# Patient Record
Sex: FEMALE | Race: WHITE | NOT HISPANIC OR LATINO | Employment: OTHER | URBAN - METROPOLITAN AREA
[De-identification: names, ages, dates, MRNs, and addresses within clinical notes are randomized per-mention and may not be internally consistent; named-entity substitution may affect disease eponyms.]

---

## 2017-09-08 ENCOUNTER — OFFICE VISIT (OUTPATIENT)
Dept: ORTHOPEDIC SURGERY | Facility: CLINIC | Age: 67
End: 2017-09-08

## 2017-09-08 VITALS — HEIGHT: 68 IN | TEMPERATURE: 98.1 F | BODY MASS INDEX: 41.98 KG/M2 | WEIGHT: 277 LBS

## 2017-09-08 DIAGNOSIS — Z98.1 HISTORY OF LUMBAR FUSION: Primary | ICD-10-CM

## 2017-09-08 DIAGNOSIS — M25.519 NECK AND SHOULDER PAIN: ICD-10-CM

## 2017-09-08 DIAGNOSIS — Z98.1 S/P CERVICAL SPINAL FUSION: ICD-10-CM

## 2017-09-08 DIAGNOSIS — M54.2 NECK AND SHOULDER PAIN: ICD-10-CM

## 2017-09-08 PROCEDURE — 72040 X-RAY EXAM NECK SPINE 2-3 VW: CPT | Performed by: ORTHOPAEDIC SURGERY

## 2017-09-08 PROCEDURE — 99213 OFFICE O/P EST LOW 20 MIN: CPT | Performed by: ORTHOPAEDIC SURGERY

## 2017-09-08 PROCEDURE — 73030 X-RAY EXAM OF SHOULDER: CPT | Performed by: ORTHOPAEDIC SURGERY

## 2017-09-08 NOTE — PROGRESS NOTES
She is complaining of neck and back pain which is chronic.  I've done the anterior cervical disc infusion couple years ago and in the low back laminectomy and fusion longer ago.  She also has some left shoulder pain which is chronic.  She seen Dr. Tyson for this years ago.  On exam intact neurologic function the upper and lower extremities.  She does have limited external rotation of the left shoulder and slight weakness in abduction.  Almost feels like a frozen shoulder.  Lumbar x-rays are obscured by artifact and I did not charge her for these.  Nothing grossly changed compared to prior films.  Cervical films show solid fusion with minimal change above which is the similar to last x-ray.  Left shoulder films obtained today show some inferior glenohumeral spurring which was not present in 2010 I'm going to recommend traction for the neck bad like Dr. Tyson to look at her shoulder it's possibility of frozen shoulders from the neck but I think she is also developed the degenerative arthritis over the years

## 2017-10-09 ENCOUNTER — CONSULT (OUTPATIENT)
Dept: ORTHOPEDIC SURGERY | Facility: CLINIC | Age: 67
End: 2017-10-09

## 2017-10-09 VITALS — TEMPERATURE: 98.2 F | WEIGHT: 283.6 LBS | HEIGHT: 67 IN | BODY MASS INDEX: 44.51 KG/M2

## 2017-10-09 DIAGNOSIS — R52 PAIN: Primary | ICD-10-CM

## 2017-10-09 DIAGNOSIS — M19.012 PRIMARY LOCALIZED OSTEOARTHROSIS OF LEFT SHOULDER REGION: ICD-10-CM

## 2017-10-09 DIAGNOSIS — IMO0002 BURSITIS/TENDONITIS, SHOULDER: ICD-10-CM

## 2017-10-09 PROCEDURE — 20610 DRAIN/INJ JOINT/BURSA W/O US: CPT | Performed by: ORTHOPAEDIC SURGERY

## 2017-10-09 PROCEDURE — 99214 OFFICE O/P EST MOD 30 MIN: CPT | Performed by: ORTHOPAEDIC SURGERY

## 2017-10-09 RX ADMIN — BUPIVACAINE HYDROCHLORIDE 4 ML: 5 INJECTION, SOLUTION EPIDURAL; INTRACAUDAL at 14:58

## 2017-10-09 RX ADMIN — METHYLPREDNISOLONE ACETATE 80 MG: 80 INJECTION, SUSPENSION INTRA-ARTICULAR; INTRALESIONAL; INTRAMUSCULAR; SOFT TISSUE at 14:58

## 2017-10-09 NOTE — PROGRESS NOTES
"   New Left Shoulder      Patient: Nancy Sawant        YOB: 1950    Medical Record Number: [id number]        Chief Complaints: Left shoulder pain  Chief Complaint   Patient presents with   • Left Shoulder - Establish Care, Pain           History of Present Illness: This is a  66 y.o. female who presents with ( With complaints of left shoulder pain she is right-hand-dominant is been ongoing \"a long time.\"  Worse the last couple years much worse the last couple months.  Initially was intermittent now is constant she has no night pain she can get her bra on but it does hurt.  She does have some limitation of her motion her symptoms are severe constant stabbing aching burning clicking popping snapping.  She is retired her past medical history is mild for diabetes anemia asthma depression anxiety sleep apnea and obesity          Allergies:   Allergies   Allergen Reactions   • Ace Inhibitors Other (See Comments)     COUGH       Medications:   Home Medications:  Current Outpatient Prescriptions on File Prior to Visit   Medication Sig   • ACCU-CHEK CLOVIS PLUS test strip    • aspirin 81 MG EC tablet Take 81 mg by mouth daily.   • atenolol (TENORMIN) 25 MG tablet TAKE ONE TABLET BY MOUTH ONE TIME DAILY   • baclofen (LIORESAL) 10 MG tablet TAKE ONE TABLET BY MOUTH NIGHTLY AT BEDTIME   • BD PEN NEEDLE BRIELLE U/F 32G X 4 MM misc    • diclofenac (VOLTAREN) 50 MG EC tablet TAKE 1 TABLET BY MOUTH 2 (TWO) TIMES DAILY FOR 30 DAYS   • DULoxetine (CYMBALTA) 60 MG capsule TAKE ONE CAPSULE BY MOUTH ONE TIME DAILY   • esomeprazole (NexIUM) 40 MG capsule Take 40 mg by mouth every morning before breakfast.   • gabapentin (NEURONTIN) 100 MG capsule    • glipiZIDE (GLUCOTROL) 10 MG tablet    • glipiZIDE (GLUCOTROL) 10 MG tablet TAKE ONE TABLET BY MOUTH TWICE DAILY BEFORE MEALS   • hydrochlorothiazide (HYDRODIURIL) 25 MG tablet TAKE ONE TABLET BY MOUTH ONE TIME DAILY   • JANUVIA 100 MG tablet TAKE ONE TABLET BY MOUTH ONE TIME " DAILY   • lidocaine (LIDODERM) 5 %    • losartan (COZAAR) 100 MG tablet TAKE ONE TABLET BY MOUTH ONE TIME DAILY   • meloxicam (MOBIC) 7.5 MG tablet Take 7.5 mg by mouth daily.   • metFORMIN (GLUCOPHAGE) 1000 MG tablet TAKE ONE TABLET BY MOUTH TWICE A DAY WITH MEALS   • montelukast (SINGULAIR) 10 MG tablet    • Multiple Vitamin (MULTI VITAMIN DAILY PO) Take  by mouth.   • Omega-3 Fatty Acids (FISH OIL PO) Take  by mouth.   • sulfamethoxazole-trimethoprim (BACTRIM DS,SEPTRA DS) 800-160 MG per tablet TAKE 1 TABLET BY MOUTH TWICE A DAY FOR 10 DAYS   • VITAMIN D, CHOLECALCIFEROL, PO Take  by mouth.   • XOPENEX HFA 45 MCG/ACT inhaler      No current facility-administered medications on file prior to visit.      Current Medications:  Scheduled Meds:  Continuous Infusions:  No current facility-administered medications for this visit.   PRN Meds:.    Past Medical History:   Diagnosis Date   • Anemia    • Arthritis    • Asthma    • Diabetes    • High blood pressure    • Sleep apnea         Past Surgical History:   Procedure Laterality Date   • BACK SURGERY     • BRAIN SURGERY     • BREAST BIOPSY     • CHOLECYSTECTOMY     • ELBOW PROCEDURE     • FOOT SURGERY     • HAND SURGERY     • NECK SURGERY     • WRIST SURGERY          Social History     Occupational History   • Not on file.     Social History Main Topics   • Smoking status: Former Smoker     Quit date: 1992   • Smokeless tobacco: Not on file   • Alcohol use Defer   • Drug use: Defer   • Sexual activity: Defer    Social History     Social History Narrative        Family History   Problem Relation Age of Onset   • No Known Problems Mother    • No Known Problems Father    • No Known Problems Sister    • No Known Problems Brother    • No Known Problems Maternal Aunt    • No Known Problems Maternal Uncle    • No Known Problems Paternal Aunt    • No Known Problems Paternal Uncle    • No Known Problems Maternal Grandmother    • No Known Problems Maternal Grandfather    • No  "Known Problems Paternal Grandmother    • No Known Problems Paternal Grandfather    • Cancer Other    • Clotting disorder Other    • Hypertension Other    • Anesthesia problems Neg Hx    • Broken bones Neg Hx    • Collagen disease Neg Hx    • Diabetes Neg Hx    • Dislocations Neg Hx    • Osteoporosis Neg Hx    • Rheumatologic disease Neg Hx    • Scoliosis Neg Hx    • Severe sprains Neg Hx              Review of Systems: 14 point review of systems are remarkable for the pertinent positives listed in the chart by the patient the remainder are negative    Review of Systems      Physical Exam: 66 y.o. female  General Appearance:    Alert, cooperative, in no acute distress                 Vitals:    10/09/17 1407   Temp: 98.2 °F (36.8 °C)   TempSrc: Temporal Artery    Weight: 283 lb 9.6 oz (129 kg)   Height: 67\" (170.2 cm)      Patient is alert and read ×3 no acute distress appears her above-listed at height weight and age.  Affect is normal respiratory rate is normal unlabored. Heart rate regular rate rhythm, sclera, dentition and hearing are normal for the purpose of this exam.    Ortho Exam Physical exam of the left shoulder reveals no overlying skin changes no lymphedema no lymphadenopathy.  Patient has active flexion 175 with mild symptoms abduction is similar external rotation is to 40 and internal rotation to the upper lumbar spine with mild symptoms.  Patient has good rotator cuff strength 4+ over 5 with isometric strength testing with pain.  Patient has a positive impingement and a positive Bell sign.  Patient has good cervical range of motion which is full and asymptomatic no radicular symptoms.  Patient has a normal elbow exam.  Good distal pulses are presentPatient has pain with overhead activity and a positive Neer sign and a positive empty can sign        Large Joint Arthrocentesis  Date/Time: 10/9/2017 2:58 PM  Consent given by: patient  Site marked: site marked  Timeout: Immediately prior to procedure a " time out was called to verify the correct patient, procedure, equipment, support staff and site/side marked as required   Supporting Documentation  Indications: pain   Procedure Details  Location: shoulder - L subacromial bursa  Preparation: Patient was prepped and draped in the usual sterile fashion  Needle size: 25 G  Approach: posterior  Medications administered: 4 mL bupivacaine (PF) 0.5 %; 80 mg methylPREDNISolone acetate 80 MG/ML  Patient tolerance: patient tolerated the procedure well with no immediate complications                Radiology:   AP, Scapular Y and Axillary Lateral of the left shoulder were ordered/reviewed to evauate shoulder pain.  These were taken in the office previously by another provider I did review these today she does have some very mild degenerative changes as best seen on the axillary lateral she has narrowing of the joint space posteriorly some mild acromioclavicular arthritis no acute bony pathology    Assessment/Plan:    Left shoulder pain I do think she has some mild arthritis of think she has some limitation of her motion could be he's a capsulitis most likely it is arthritis.  I do think there are some rotator cuff involvement.  Plan is proceed with an injection I would do half glenohumeral have subacromial have her see physical therapy if she fails to improve we will pursue other means of testing

## 2017-10-10 RX ORDER — METHYLPREDNISOLONE ACETATE 80 MG/ML
80 INJECTION, SUSPENSION INTRA-ARTICULAR; INTRALESIONAL; INTRAMUSCULAR; SOFT TISSUE
Status: COMPLETED | OUTPATIENT
Start: 2017-10-09 | End: 2017-10-09

## 2017-10-10 RX ORDER — BUPIVACAINE HYDROCHLORIDE 5 MG/ML
4 INJECTION, SOLUTION EPIDURAL; INTRACAUDAL
Status: COMPLETED | OUTPATIENT
Start: 2017-10-09 | End: 2017-10-09

## 2017-11-20 ENCOUNTER — APPOINTMENT (OUTPATIENT)
Dept: WOMENS IMAGING | Facility: HOSPITAL | Age: 67
End: 2017-11-20

## 2017-11-20 PROCEDURE — G0202 SCR MAMMO BI INCL CAD: HCPCS | Performed by: RADIOLOGY

## 2017-11-20 PROCEDURE — 77063 BREAST TOMOSYNTHESIS BI: CPT | Performed by: RADIOLOGY

## 2017-12-13 ENCOUNTER — OFFICE (OUTPATIENT)
Dept: URBAN - METROPOLITAN AREA CLINIC 64 | Facility: CLINIC | Age: 67
End: 2017-12-13
Payer: COMMERCIAL

## 2017-12-13 ENCOUNTER — AMBULATORY SURGICAL CENTER (OUTPATIENT)
Dept: URBAN - METROPOLITAN AREA SURGERY 20 | Facility: SURGERY | Age: 67
End: 2017-12-13
Payer: COMMERCIAL

## 2017-12-13 DIAGNOSIS — K21.0 GASTRO-ESOPHAGEAL REFLUX DISEASE WITH ESOPHAGITIS: ICD-10-CM

## 2017-12-13 PROBLEM — K21.9 GASTROESOPHAGEAL REFLUX DISEASE: Status: ACTIVE | Noted: 2017-12-13

## 2017-12-13 PROBLEM — K31.7 POLYP OF STOMACH AND DUODENUM: Status: ACTIVE | Noted: 2017-12-13

## 2017-12-13 PROCEDURE — 43239 EGD BIOPSY SINGLE/MULTIPLE: CPT

## 2017-12-13 PROCEDURE — 88305 TISSUE EXAM BY PATHOLOGIST: CPT

## 2018-08-27 ENCOUNTER — OFFICE (OUTPATIENT)
Dept: URBAN - METROPOLITAN AREA CLINIC 66 | Facility: CLINIC | Age: 68
End: 2018-08-27
Payer: COMMERCIAL

## 2018-08-27 VITALS
HEART RATE: 97 BPM | SYSTOLIC BLOOD PRESSURE: 131 MMHG | HEIGHT: 67 IN | WEIGHT: 273 LBS | DIASTOLIC BLOOD PRESSURE: 71 MMHG

## 2018-08-27 DIAGNOSIS — R94.5 ABNORMAL RESULTS OF LIVER FUNCTION STUDIES: ICD-10-CM

## 2018-08-27 DIAGNOSIS — K52.9 NONINFECTIVE GASTROENTERITIS AND COLITIS, UNSPECIFIED: ICD-10-CM

## 2018-08-27 DIAGNOSIS — R10.13 EPIGASTRIC PAIN: ICD-10-CM

## 2018-08-27 DIAGNOSIS — R14.0 ABDOMINAL DISTENSION (GASEOUS): ICD-10-CM

## 2018-08-27 PROCEDURE — 99214 OFFICE O/P EST MOD 30 MIN: CPT

## 2018-11-19 ENCOUNTER — APPOINTMENT (OUTPATIENT)
Dept: WOMENS IMAGING | Facility: HOSPITAL | Age: 68
End: 2018-11-19

## 2018-11-19 PROCEDURE — 77063 BREAST TOMOSYNTHESIS BI: CPT | Performed by: RADIOLOGY

## 2018-11-19 PROCEDURE — 77067 SCR MAMMO BI INCL CAD: CPT | Performed by: RADIOLOGY

## 2018-12-03 ENCOUNTER — APPOINTMENT (OUTPATIENT)
Dept: WOMENS IMAGING | Facility: HOSPITAL | Age: 68
End: 2018-12-03

## 2018-12-03 PROCEDURE — 77065 DX MAMMO INCL CAD UNI: CPT | Performed by: RADIOLOGY

## 2019-03-08 ENCOUNTER — OFFICE (OUTPATIENT)
Dept: URBAN - METROPOLITAN AREA PATHOLOGY 4 | Facility: PATHOLOGY | Age: 69
End: 2019-03-08
Payer: COMMERCIAL

## 2019-03-08 ENCOUNTER — AMBULATORY SURGICAL CENTER (OUTPATIENT)
Dept: URBAN - METROPOLITAN AREA SURGERY 20 | Facility: SURGERY | Age: 69
End: 2019-03-08
Payer: COMMERCIAL

## 2019-03-08 VITALS — HEIGHT: 67 IN

## 2019-03-08 DIAGNOSIS — K31.89 OTHER DISEASES OF STOMACH AND DUODENUM: ICD-10-CM

## 2019-03-08 DIAGNOSIS — K31.7 POLYP OF STOMACH AND DUODENUM: ICD-10-CM

## 2019-03-08 DIAGNOSIS — R13.10 DYSPHAGIA, UNSPECIFIED: ICD-10-CM

## 2019-03-08 DIAGNOSIS — K21.9 GASTRO-ESOPHAGEAL REFLUX DISEASE WITHOUT ESOPHAGITIS: ICD-10-CM

## 2019-03-08 DIAGNOSIS — K29.60 OTHER GASTRITIS WITHOUT BLEEDING: ICD-10-CM

## 2019-03-08 DIAGNOSIS — K52.9 NONINFECTIVE GASTROENTERITIS AND COLITIS, UNSPECIFIED: ICD-10-CM

## 2019-03-08 DIAGNOSIS — D64.9 ANEMIA, UNSPECIFIED: ICD-10-CM

## 2019-03-08 DIAGNOSIS — K57.30 DIVERTICULOSIS OF LARGE INTESTINE WITHOUT PERFORATION OR ABS: ICD-10-CM

## 2019-03-08 PROBLEM — K29.70 GASTRITIS, UNSPECIFIED, WITHOUT BLEEDING: Status: ACTIVE | Noted: 2019-03-08

## 2019-03-08 LAB
GI HISTOLOGY: A. SELECT: (no result)
GI HISTOLOGY: B. SELECT: (no result)
GI HISTOLOGY: C. SELECT: (no result)
GI HISTOLOGY: E. SELECT: (no result)
GI HISTOLOGY: F. UNSPECIFIED: (no result)
GI HISTOLOGY: PDF REPORT: (no result)
Lab: (no result)

## 2019-03-08 PROCEDURE — 45380 COLONOSCOPY AND BIOPSY: CPT

## 2019-03-08 PROCEDURE — 88305 TISSUE EXAM BY PATHOLOGIST: CPT

## 2019-03-08 PROCEDURE — 43239 EGD BIOPSY SINGLE/MULTIPLE: CPT

## 2019-07-22 ENCOUNTER — APPOINTMENT (OUTPATIENT)
Dept: WOMENS IMAGING | Facility: HOSPITAL | Age: 69
End: 2019-07-22

## 2019-07-22 PROCEDURE — G0279 TOMOSYNTHESIS, MAMMO: HCPCS | Performed by: RADIOLOGY

## 2019-07-22 PROCEDURE — 77065 DX MAMMO INCL CAD UNI: CPT | Performed by: RADIOLOGY

## 2020-02-13 ENCOUNTER — OFFICE VISIT (OUTPATIENT)
Dept: ORTHOPEDIC SURGERY | Facility: CLINIC | Age: 70
End: 2020-02-13

## 2020-02-13 VITALS — HEIGHT: 67 IN | TEMPERATURE: 98 F | WEIGHT: 268.3 LBS | BODY MASS INDEX: 42.11 KG/M2

## 2020-02-13 DIAGNOSIS — M25.511 BILATERAL SHOULDER PAIN, UNSPECIFIED CHRONICITY: Primary | ICD-10-CM

## 2020-02-13 DIAGNOSIS — M25.512 BILATERAL SHOULDER PAIN, UNSPECIFIED CHRONICITY: Primary | ICD-10-CM

## 2020-02-13 DIAGNOSIS — IMO0002 BURSITIS/TENDONITIS, SHOULDER: ICD-10-CM

## 2020-02-13 DIAGNOSIS — M19.012 PRIMARY LOCALIZED OSTEOARTHROSIS OF LEFT SHOULDER REGION: ICD-10-CM

## 2020-02-13 PROCEDURE — 73030 X-RAY EXAM OF SHOULDER: CPT | Performed by: ORTHOPAEDIC SURGERY

## 2020-02-13 PROCEDURE — 20610 DRAIN/INJ JOINT/BURSA W/O US: CPT | Performed by: ORTHOPAEDIC SURGERY

## 2020-02-13 PROCEDURE — 99214 OFFICE O/P EST MOD 30 MIN: CPT | Performed by: ORTHOPAEDIC SURGERY

## 2020-02-13 RX ORDER — CETIRIZINE HYDROCHLORIDE 10 MG/1
10 TABLET ORAL DAILY
COMMUNITY

## 2020-02-13 RX ORDER — RIBOFLAVIN (VITAMIN B2) 100 MG
TABLET ORAL
COMMUNITY

## 2020-02-13 RX ORDER — CARVEDILOL 12.5 MG/1
12.5 TABLET ORAL 2 TIMES DAILY WITH MEALS
COMMUNITY

## 2020-02-13 RX ORDER — AMLODIPINE BESYLATE 5 MG/1
5 TABLET ORAL DAILY
COMMUNITY

## 2020-02-13 RX ADMIN — METHYLPREDNISOLONE ACETATE 80 MG: 80 INJECTION, SUSPENSION INTRA-ARTICULAR; INTRALESIONAL; INTRAMUSCULAR; SOFT TISSUE at 11:27

## 2020-02-13 RX ADMIN — METHYLPREDNISOLONE ACETATE 80 MG: 80 INJECTION, SUSPENSION INTRA-ARTICULAR; INTRALESIONAL; INTRAMUSCULAR; SOFT TISSUE at 11:26

## 2020-02-13 NOTE — PROGRESS NOTES
New bilateral Shoulder      Patient: Nancy Sawant        YOB: 1950    Medical Record Number: [id number]        Chief Complaints: bilateral shoulder pain      History of Present Illness: This is a 69-year-old female who presents complaining of bilateral shoulder pain right equal to left she is right-hand dominant is been ongoing for 5 months symptoms are worse.  She had left carpal tunnel and cubital tunnel release and she is wondering if that has any bearing on the worsening of her shoulder symptoms.  Her current symptoms are severe constant stabbing aching worse with driving reaching better with ice and rest she is retired past medical history is more for sleep apnea anemia thyroid disease and diabetes      Allergies:   Allergies   Allergen Reactions   • Ace Inhibitors Other (See Comments)     COUGH   • Exenatide Other (See Comments)     Elevated LFTs   • Statins Other (See Comments)     myalgia   • Liraglutide Nausea And Vomiting     Nausea and vomiting        Medications:   Home Medications:  Current Outpatient Medications on File Prior to Visit   Medication Sig   • ACCU-CHEK CLOVIS PLUS test strip    • amLODIPine (NORVASC) 5 MG tablet Take 5 mg by mouth Daily.   • ascorbic acid (VITAMIN C) 100 MG tablet Take  by mouth.   • aspirin 81 MG EC tablet Take 81 mg by mouth daily.   • baclofen (LIORESAL) 10 MG tablet TAKE ONE TABLET BY MOUTH NIGHTLY AT BEDTIME   • carvedilol (COREG) 12.5 MG tablet Take 12.5 mg by mouth 2 (Two) Times a Day With Meals.   • cetirizine (zyrTEC) 10 MG tablet Take 10 mg by mouth Daily.   • DULoxetine (CYMBALTA) 60 MG capsule TAKE ONE CAPSULE BY MOUTH ONE TIME DAILY   • esomeprazole (NexIUM) 40 MG capsule Take 40 mg by mouth every morning before breakfast.   • Fluticasone Furoate-Vilanterol (BREO ELLIPTA) 200-25 MCG/INH inhaler Breo Ellipta 200 mcg-25 mcg/dose powder for inhalation   • glipiZIDE (GLUCOTROL) 10 MG tablet    • hydrochlorothiazide (HYDRODIURIL) 25 MG tablet TAKE  ONE TABLET BY MOUTH ONE TIME DAILY   • IRON PO Take  by mouth.   • losartan (COZAAR) 100 MG tablet TAKE ONE TABLET BY MOUTH ONE TIME DAILY   • metFORMIN (GLUCOPHAGE) 1000 MG tablet TAKE ONE TABLET BY MOUTH TWICE A DAY WITH MEALS   • montelukast (SINGULAIR) 10 MG tablet    • Multiple Vitamin (MULTI VITAMIN DAILY PO) Take  by mouth.   • Omega-3 Fatty Acids (FISH OIL PO) Take  by mouth.   • VITAMIN D, CHOLECALCIFEROL, PO Take  by mouth.   • atenolol (TENORMIN) 25 MG tablet TAKE ONE TABLET BY MOUTH ONE TIME DAILY   • BD PEN NEEDLE BRIELLE U/F 32G X 4 MM misc    • diclofenac (VOLTAREN) 50 MG EC tablet TAKE 1 TABLET BY MOUTH 2 (TWO) TIMES DAILY FOR 30 DAYS   • gabapentin (NEURONTIN) 100 MG capsule    • glipiZIDE (GLUCOTROL) 10 MG tablet TAKE ONE TABLET BY MOUTH TWICE DAILY BEFORE MEALS   • JANUVIA 100 MG tablet TAKE ONE TABLET BY MOUTH ONE TIME DAILY   • lidocaine (LIDODERM) 5 %    • meloxicam (MOBIC) 7.5 MG tablet Take 7.5 mg by mouth daily.   • sulfamethoxazole-trimethoprim (BACTRIM DS,SEPTRA DS) 800-160 MG per tablet TAKE 1 TABLET BY MOUTH TWICE A DAY FOR 10 DAYS   • XOPENEX HFA 45 MCG/ACT inhaler      No current facility-administered medications on file prior to visit.      Current Medications:  Scheduled Meds:  Continuous Infusions:  No current facility-administered medications for this visit.   PRN Meds:.    Past Medical History:   Diagnosis Date   • Anemia    • Arthritis    • Asthma    • Diabetes (CMS/HCC)    • High blood pressure    • Sleep apnea         Past Surgical History:   Procedure Laterality Date   • BACK SURGERY     • BRAIN SURGERY     • BREAST BIOPSY     • CHOLECYSTECTOMY     • ELBOW PROCEDURE     • FOOT SURGERY     • HAND SURGERY     • NECK SURGERY     • WRIST SURGERY          Social History     Occupational History   • Not on file   Tobacco Use   • Smoking status: Former Smoker     Last attempt to quit:      Years since quittin.1   • Smokeless tobacco: Never Used   Substance and Sexual Activity  "  • Alcohol use: Defer   • Drug use: Defer   • Sexual activity: Defer      Social History     Social History Narrative   • Not on file        Family History   Problem Relation Age of Onset   • No Known Problems Mother    • No Known Problems Father    • No Known Problems Sister    • No Known Problems Brother    • No Known Problems Maternal Aunt    • No Known Problems Maternal Uncle    • No Known Problems Paternal Aunt    • No Known Problems Paternal Uncle    • No Known Problems Maternal Grandmother    • No Known Problems Maternal Grandfather    • No Known Problems Paternal Grandmother    • No Known Problems Paternal Grandfather    • Cancer Other    • Clotting disorder Other    • Hypertension Other    • Anesthesia problems Neg Hx    • Broken bones Neg Hx    • Collagen disease Neg Hx    • Diabetes Neg Hx    • Dislocations Neg Hx    • Osteoporosis Neg Hx    • Rheumatologic disease Neg Hx    • Scoliosis Neg Hx    • Severe sprains Neg Hx              Review of Systems: 14 point review of systems are remarkable for the multiple pertinent positives listed in the chart by the patient the remainder negative    Review of Systems      Physical Exam: 69 y.o. female  General Appearance:    Alert, cooperative, in no acute distress                   Vitals:    02/13/20 1106   Temp: 98 °F (36.7 °C)   Weight: 122 kg (268 lb 4.8 oz)   Height: 170.2 cm (67\")   PainSc:   6      Patient is alert and read ×3 no acute distress appears her above-listed at height weight and age.  Affect is normal respiratory rate is normal unlabored. Heart rate regular rate rhythm, sclera, dentition and hearing are normal for the purpose of this exam.    Ortho Exam Physical exam the left shoulder reveals no overlying skin changes no lymphedema lymphadenopathy the patient can actively flex to about 150 passively I get them to 160 abduction is similar external rotation is 40 internal rotation to there buttock.  Rotator cuff strength is 4+ over 5 with isometric " strength testing no overlying skin changes.  Patient has reasonable cervical range of motion for their age no radicular symptoms and a normal elbow exam.  There are good distal pulses.    Physical exam of the right shoulder reveals no overlying skin changes no lymphedema no lymphadenopathy.  Patient has active flexion 180 with mild symptoms abduction is similar external rotation is to 50 and internal rotation to the upper lumbar spine with mild symptoms.  Patient has good rotator cuff strength 4+ over 5 with isometric strength testing with pain.  Patient has a positive impingement and a positive Bell sign.  Patient has good cervical range of motion which is full and asymptomatic no radicular symptoms.  Patient has a normal elbow exam.  Good distal pulses are present  Patient has pain with overhead activity and a positive Neer sign and a positive empty can sign , a positive drop arm and a definitive painful arc    Large Joint Arthrocentesis: L glenohumeral  Date/Time: 2/13/2020 11:26 AM  Consent given by: patient  Site marked: site marked  Timeout: Immediately prior to procedure a time out was called to verify the correct patient, procedure, equipment, support staff and site/side marked as required   Supporting Documentation  Indications: pain and joint swelling   Procedure Details  Location: shoulder - L glenohumeral  Preparation: Patient was prepped and draped in the usual sterile fashion  Needle size: 22 G  Approach: anterolateral  Medications administered: 4 mL lidocaine (cardiac); 80 mg methylPREDNISolone acetate 80 MG/ML  Patient tolerance: patient tolerated the procedure well with no immediate complications    Large Joint Arthrocentesis: R subacromial bursa  Date/Time: 2/13/2020 11:27 AM  Consent given by: patient  Site marked: site marked  Timeout: Immediately prior to procedure a time out was called to verify the correct patient, procedure, equipment, support staff and site/side marked as required    Supporting Documentation  Indications: pain and joint swelling   Procedure Details  Location: shoulder - R subacromial bursa  Preparation: Patient was prepped and draped in the usual sterile fashion  Needle size: 22 G  Approach: posterior  Medications administered: 4 mL lidocaine (cardiac); 80 mg methylPREDNISolone acetate 80 MG/ML  Patient tolerance: patient tolerated the procedure well with no immediate complications                Radiology:   AP, Scapular Y and Axillary Lateral of the right and left shoulder were ordered/reviewed to evauate shoulder pain.  I have no comparative films available.  She does have degenerative changes on the left with osteophytes and narrowing of the joint space seen has some acromioclavicular arthritis bilaterally on the right she has good maintenance of her joint space no significant arthritis  Imaging Results (Most Recent)     Procedure Component Value Units Date/Time    XR Shoulder 2+ View Bilateral [[id number]] Resulted:  02/13/20 1100     Updated:  02/13/20 1100    Impression:       Ordering physician's impression is located in the Encounter Note dated 02/13/20. X-ray performed in the DR room.          Assessment/Plan: Bilateral shoulder pain I think the left is from arthritis possibly from positioning on the table the time of her surgery which should respond nicely to an injection that fails to we did talk a little bit about arthroplasty.  On the right do not appreciate a lot of arthritis perhaps this is just some impingement from overuse of protecting the left.  I think this would also benefit from a subacromial injection if that fails to improve we could pursue other means of testing  Cortisone Injection. See procedure note.  Cortisone Injection for DIAGNOSTIC and THERAPUTIC purposes.

## 2020-02-18 RX ORDER — METHYLPREDNISOLONE ACETATE 80 MG/ML
80 INJECTION, SUSPENSION INTRA-ARTICULAR; INTRALESIONAL; INTRAMUSCULAR; SOFT TISSUE
Status: COMPLETED | OUTPATIENT
Start: 2020-02-13 | End: 2020-02-13

## 2020-03-06 ENCOUNTER — APPOINTMENT (OUTPATIENT)
Dept: WOMENS IMAGING | Facility: HOSPITAL | Age: 70
End: 2020-03-06

## 2020-03-06 PROCEDURE — G0279 TOMOSYNTHESIS, MAMMO: HCPCS | Performed by: RADIOLOGY

## 2020-03-06 PROCEDURE — 77066 DX MAMMO INCL CAD BI: CPT | Performed by: RADIOLOGY

## 2020-03-25 ENCOUNTER — APPOINTMENT (OUTPATIENT)
Dept: WOMENS IMAGING | Facility: HOSPITAL | Age: 70
End: 2020-03-25

## 2020-03-25 PROCEDURE — 76641 ULTRASOUND BREAST COMPLETE: CPT | Performed by: RADIOLOGY

## 2020-04-13 ENCOUNTER — TELEPHONE (OUTPATIENT)
Dept: ORTHOPEDIC SURGERY | Facility: CLINIC | Age: 70
End: 2020-04-13

## 2020-04-13 NOTE — TELEPHONE ENCOUNTER
CALLED PATIENT LEFT A MESSAGE WITH [facility] RECOMMENDATIONS.  TOLD HER TO CALL BACK IF SHE WOULD LIKE TO HAVE A VIDEO VISIT ON Thursday.

## 2020-04-13 NOTE — TELEPHONE ENCOUNTER
Patient returned missed call. Was informed of [facility] recommendations & patient requested Video Visit. Scheduled patient with [facility] this Thurs 4/16 at 1400 for FU / LEFT Shldr / Video Visit per [facility].     Emailed patient with instructions to get set up for Video Visit. Patient verbalized understanding.

## 2020-04-13 NOTE — TELEPHONE ENCOUNTER
This is the one with arthritis.  She can try ice and heat alternating.  I can do a video appt on thurs

## 2020-04-16 ENCOUNTER — TELEMEDICINE (OUTPATIENT)
Dept: ORTHOPEDIC SURGERY | Facility: CLINIC | Age: 70
End: 2020-04-16

## 2020-04-16 DIAGNOSIS — M19.012 PRIMARY LOCALIZED OSTEOARTHROSIS OF LEFT SHOULDER REGION: Primary | ICD-10-CM

## 2020-04-16 PROCEDURE — 99212 OFFICE O/P EST SF 10 MIN: CPT | Performed by: ORTHOPAEDIC SURGERY

## 2020-04-19 NOTE — PROGRESS NOTES
Shoulder Follow Up telephone visit      Patient: Nancy Sawant        YOB: 1950            Chief Complaints: Shoulder pain left      History of Present Illness: This is a follow-up visit with the patient plan was to do a video visit however video platform was down therefore we did a telephone visit.  The patient has been several month ago for bilateral shoulder pain the left one had degenerative changes the right one look pretty good.  I injected them both the right one is still doing good the left one is really bothering her spine her down into the area of the biceps having a hard time sleeping at night she has known degenerative changes on the side she agreed to a video visit      Physical Exam: 69 y.o. female  General Appearance:    Alert, cooperative, in no acute distress                 There were no vitals filed for this visit.     Patient is alert and read ×3 no acute distress appears her above-listed at height weight and age.  Affect is normal respiratory rate is normal unlabored. Heart rate regular rate rhythm, sclera, dentition and hearing are normal for the purpose of this exam.      Ortho Exam    Patient states her motion is worse and she cannot do her hair she cannot do her bra cannot reach anything behind her back she has pain at end range of all ranges of motion          Assessment/Plan: Previous x-rays on the left do show degenerative changes.  We talked about options including a repeat injection at some point I do think she needs to meet with Dr. Rivera to discuss future options of shoulder arthroplasty she was agreeable to do that I will have them call and make her that appointment total time was approximately 10 minutes

## 2020-04-21 ENCOUNTER — TELEPHONE (OUTPATIENT)
Dept: ORTHOPEDIC SURGERY | Facility: CLINIC | Age: 70
End: 2020-04-21

## 2020-04-21 NOTE — TELEPHONE ENCOUNTER
Patient referred by [facility].  Do you want to work patient in?  If so do you want to do a video visit or come in office? Please advise

## 2021-01-15 DIAGNOSIS — Z23 NEED FOR VACCINATION: ICD-10-CM

## 2021-03-02 DIAGNOSIS — Z23 IMMUNIZATION DUE: ICD-10-CM

## 2021-08-02 ENCOUNTER — APPOINTMENT (OUTPATIENT)
Dept: WOMENS IMAGING | Facility: HOSPITAL | Age: 71
End: 2021-08-02

## 2021-08-02 PROCEDURE — 76641 ULTRASOUND BREAST COMPLETE: CPT | Performed by: RADIOLOGY

## 2021-08-02 PROCEDURE — 77066 DX MAMMO INCL CAD BI: CPT | Performed by: RADIOLOGY

## 2021-08-02 PROCEDURE — G0279 TOMOSYNTHESIS, MAMMO: HCPCS | Performed by: RADIOLOGY
